# Patient Record
Sex: MALE | Race: BLACK OR AFRICAN AMERICAN | NOT HISPANIC OR LATINO | Employment: OTHER | URBAN - METROPOLITAN AREA
[De-identification: names, ages, dates, MRNs, and addresses within clinical notes are randomized per-mention and may not be internally consistent; named-entity substitution may affect disease eponyms.]

---

## 2017-01-05 ENCOUNTER — HOSPITAL ENCOUNTER (EMERGENCY)
Facility: HOSPITAL | Age: 67
Discharge: HOME | End: 2017-01-05

## 2017-01-05 VITALS
WEIGHT: 205 LBS | BODY MASS INDEX: 30.36 KG/M2 | HEIGHT: 69 IN | SYSTOLIC BLOOD PRESSURE: 160 MMHG | OXYGEN SATURATION: 100 % | RESPIRATION RATE: 18 BRPM | TEMPERATURE: 98 F | HEART RATE: 94 BPM | DIASTOLIC BLOOD PRESSURE: 55 MMHG

## 2017-01-05 DIAGNOSIS — N28.9 ACUTE RENAL INSUFFICIENCY: ICD-10-CM

## 2017-01-05 DIAGNOSIS — N20.0 KIDNEY STONE ON LEFT SIDE: Primary | ICD-10-CM

## 2017-01-05 DIAGNOSIS — N13.2 HYDRONEPHROSIS WITH URINARY OBSTRUCTION DUE TO URETERAL CALCULUS: ICD-10-CM

## 2017-01-05 DIAGNOSIS — D72.829 LEUKOCYTOSIS, UNSPECIFIED TYPE: ICD-10-CM

## 2017-01-05 LAB
ALBUMIN SERPL BCP-MCNC: 3.6 G/DL
ALP SERPL-CCNC: 89 U/L
ALT SERPL W/O P-5'-P-CCNC: 21 U/L
ANION GAP SERPL CALC-SCNC: 9 MMOL/L
AST SERPL-CCNC: 25 U/L
BACTERIA #/AREA URNS HPF: ABNORMAL /HPF
BASOPHILS # BLD AUTO: 0.02 K/UL
BASOPHILS NFR BLD: 0.1 %
BILIRUB SERPL-MCNC: 0.8 MG/DL
BILIRUB UR QL STRIP: NEGATIVE
BUN SERPL-MCNC: 20 MG/DL
CALCIUM SERPL-MCNC: 9.5 MG/DL
CHLORIDE SERPL-SCNC: 103 MMOL/L
CLARITY UR: CLEAR
CO2 SERPL-SCNC: 26 MMOL/L
COLOR UR: YELLOW
CREAT SERPL-MCNC: 1.5 MG/DL
DIFFERENTIAL METHOD: ABNORMAL
EOSINOPHIL # BLD AUTO: 0 K/UL
EOSINOPHIL NFR BLD: 0 %
ERYTHROCYTE [DISTWIDTH] IN BLOOD BY AUTOMATED COUNT: 13.5 %
EST. GFR  (AFRICAN AMERICAN): 56 ML/MIN/1.73 M^2
EST. GFR  (NON AFRICAN AMERICAN): 49 ML/MIN/1.73 M^2
GLUCOSE SERPL-MCNC: 116 MG/DL
GLUCOSE UR QL STRIP: NEGATIVE
HCT VFR BLD AUTO: 42.4 %
HGB BLD-MCNC: 14.8 G/DL
HGB UR QL STRIP: ABNORMAL
KETONES UR QL STRIP: NEGATIVE
LEUKOCYTE ESTERASE UR QL STRIP: ABNORMAL
LYMPHOCYTES # BLD AUTO: 1.3 K/UL
LYMPHOCYTES NFR BLD: 8.1 %
MCH RBC QN AUTO: 28.2 PG
MCHC RBC AUTO-ENTMCNC: 34.9 %
MCV RBC AUTO: 81 FL
MICROSCOPIC COMMENT: ABNORMAL
MONOCYTES # BLD AUTO: 1.5 K/UL
MONOCYTES NFR BLD: 9.4 %
NEUTROPHILS # BLD AUTO: 13.3 K/UL
NEUTROPHILS NFR BLD: 82.4 %
NITRITE UR QL STRIP: NEGATIVE
PH UR STRIP: 6 [PH] (ref 5–8)
PLATELET # BLD AUTO: 243 K/UL
PMV BLD AUTO: 9.5 FL
POTASSIUM SERPL-SCNC: 4.1 MMOL/L
PROT SERPL-MCNC: 7.4 G/DL
PROT UR QL STRIP: ABNORMAL
RBC # BLD AUTO: 5.25 M/UL
RBC #/AREA URNS HPF: 40 /HPF (ref 0–4)
SODIUM SERPL-SCNC: 138 MMOL/L
SP GR UR STRIP: 1.02 (ref 1–1.03)
SQUAMOUS #/AREA URNS HPF: 5 /HPF
URN SPEC COLLECT METH UR: ABNORMAL
UROBILINOGEN UR STRIP-ACNC: NEGATIVE EU/DL
WBC # BLD AUTO: 16.2 K/UL
WBC #/AREA URNS HPF: 8 /HPF (ref 0–5)

## 2017-01-05 PROCEDURE — 96374 THER/PROPH/DIAG INJ IV PUSH: CPT

## 2017-01-05 PROCEDURE — 99284 EMERGENCY DEPT VISIT MOD MDM: CPT | Mod: 25

## 2017-01-05 PROCEDURE — 63600175 PHARM REV CODE 636 W HCPCS: Performed by: PHYSICIAN ASSISTANT

## 2017-01-05 PROCEDURE — 25000003 PHARM REV CODE 250: Performed by: PHYSICIAN ASSISTANT

## 2017-01-05 PROCEDURE — 81000 URINALYSIS NONAUTO W/SCOPE: CPT

## 2017-01-05 PROCEDURE — 80053 COMPREHEN METABOLIC PANEL: CPT

## 2017-01-05 PROCEDURE — 96361 HYDRATE IV INFUSION ADD-ON: CPT

## 2017-01-05 PROCEDURE — 85025 COMPLETE CBC W/AUTO DIFF WBC: CPT

## 2017-01-05 RX ORDER — CIPROFLOXACIN 500 MG/1
500 TABLET ORAL
Status: COMPLETED | OUTPATIENT
Start: 2017-01-05 | End: 2017-01-05

## 2017-01-05 RX ORDER — CIPROFLOXACIN 500 MG/1
500 TABLET ORAL EVERY 12 HOURS
Qty: 19 TABLET | Refills: 0 | Status: SHIPPED | OUTPATIENT
Start: 2017-01-05 | End: 2017-01-15

## 2017-01-05 RX ORDER — KETOROLAC TROMETHAMINE 10 MG/1
10 TABLET, FILM COATED ORAL EVERY 6 HOURS
COMMUNITY
End: 2017-01-05 | Stop reason: ALTCHOICE

## 2017-01-05 RX ORDER — OXYCODONE AND ACETAMINOPHEN 10; 325 MG/1; MG/1
1 TABLET ORAL EVERY 4 HOURS PRN
Qty: 15 TABLET | Refills: 0 | Status: SHIPPED | OUTPATIENT
Start: 2017-01-05

## 2017-01-05 RX ORDER — OXYCODONE AND ACETAMINOPHEN 5; 325 MG/1; MG/1
1 TABLET ORAL EVERY 4 HOURS PRN
COMMUNITY
End: 2017-01-05 | Stop reason: ALTCHOICE

## 2017-01-05 RX ORDER — HYDROMORPHONE HYDROCHLORIDE 1 MG/ML
0.5 INJECTION, SOLUTION INTRAMUSCULAR; INTRAVENOUS; SUBCUTANEOUS
Status: DISCONTINUED | OUTPATIENT
Start: 2017-01-05 | End: 2017-01-05

## 2017-01-05 RX ORDER — KETOROLAC TROMETHAMINE 30 MG/ML
30 INJECTION, SOLUTION INTRAMUSCULAR; INTRAVENOUS
Status: COMPLETED | OUTPATIENT
Start: 2017-01-05 | End: 2017-01-05

## 2017-01-05 RX ADMIN — SODIUM CHLORIDE 1000 ML: 0.9 INJECTION, SOLUTION INTRAVENOUS at 01:01

## 2017-01-05 RX ADMIN — KETOROLAC TROMETHAMINE 30 MG: 30 INJECTION, SOLUTION INTRAMUSCULAR at 01:01

## 2017-01-05 RX ADMIN — CIPROFLOXACIN HYDROCHLORIDE 500 MG: 500 TABLET, FILM COATED ORAL at 03:01

## 2017-01-05 NOTE — DISCHARGE INSTRUCTIONS
Treating Kidney Stones: Medications  In some cases, your doctor may prescribe medications to dissolve or prevent stones. Or medications may be prescribed to stop an infection. Once the infection is controlled, your stone can be removed.    Medications  For uric acid or cystine stones, your doctor may prescribe medications. Youll take these for your lifetime. Medications cant dissolve calcium oxalate stones, but often help prevent them. If you have an infection stone, your doctor may prescribe antibiotics. You may take these before and after your stone is removed.  Uric acid stones are caused by too much uric acid in your urine. This can be worsened by a high-meat diet. Allopurinol reduces uric acid. The stone can be dissolved with bicarbonate, potassium citrate, or a similar drug.  Cystine stones are caused by too much cystine (an amino acid) in your urine. This condition is uncommon and inherited. Penicillamine or tiopronin reduces cystine. Bicarbonate, potassium citrate, or a similar drug dissolves cystine stones.  Infection stones are caused by kidney or bladder infections that change the chemical balance of your urine. Antibiotics control the infection and may slow the stones growth. Then your stone is removed. Stone infections are caused by bacteria that make an enzyme called urease. Your doctor may use a medicine to block this enzyme. Your doctor may also prescribe medications to relax the ureters and allow the stones to pass through more quickly.  Calcium stones are caused by a number of different things. If you have too much calcium in your urine, your doctor may prescribe diuretics. If your urine has too much oxalate or if your stones are from too little citrate, your doctor may give you a different medication.   © 7369-7335 [employer] [address] [place] [address], [place] [address]. All rights reserved. This information is not intended as a substitute for professional medical care. Always  follow your healthcare professional's instructions.          Kidney Stone (with Pain)    The sharp cramping pain on either side of your lower back and nausea/vomiting that you have are because of a small stone that has formed in the kidney. It is now passing down a narrow tube (ureter) on its way to your bladder. Once the stone reaches your bladder, the pain will often stop. But it may come back as the stone continues to pass out of the bladder and through the urethra. The stone may pass in your urine stream in one piece. The size may be 1/16 inch to 1/4 inch (1 to 6 mm). Or, the stone may break up into yoav fragments that you may not even notice.  Once you have had a kidney stone, you are at risk of getting another one in the future. There are 4 types of kidney stones. Eighty percent are calcium stones--mostly calcium oxalate but also some with calcium phosphate. The other 3 types include uric acid stones, struvite stones (from a preceding infection), and rarely, cystine stones.  Most stones will pass on their own, but may take from a few hours to a few days. Sometimes the stone is too large to pass by itself. In that case, the health care provider will need to use other ways to remove the stone. These techniques include:  · Lithotripsy. This uses ultrasound waves to break up the stone.  · Ureteroscopy. This pushes a basket-like instrument through the urethra and bladder and into the ureter to pull out the stone.  · Various types of direct surgery through the skin  Home care  The following are general care guidelines:  · Drink plenty of fluids. This means at least 12, 8-ounce glasses of fluid--mostly water--a day.  · Each time you urinate, do so in a jar. Pour the urine from the jar through the strainer and into the toilet. Continue doing this until 24 hours after your pain stops. By then, if there was a kidney stone, it should pass from your bladder. Some stones dissolve into sand-like particles and pass right  through the strainer. In that case, you wont ever see a stone.  · Save any stone that you find in the strainer and bring it to your doctor to look at. It may be possible to stop certain types of stones from forming. For this reason, it is important to know what kind of stone you have.  · Try to stay as active as possible. This will help the stone pass. Don't stay in bed unless your pain keeps you from getting up. You may notice a red, pink, or brown color to your urine. This is normal while passing a kidney stone.  · If you develop pain, you may take ibuprofen or naproxen for pain, unless another medicine was prescribed. If you have chronic liver or kidney disease, talk with your health care provider before taking these medicines. Also talk with your provider if you've had a stomach ulcer or GI bleeding.  Preventing stones  Each year for the next 5 to 7 years, you are at risk that a new stone will form. Your risk is a 50% chance over this time period. The risk is higher if you have a family history of kidney stones or have certain chronic illnesses like hypertension, obesity, or diabetes. But you can make changes to your lifestyle and diet that can lower your risk for another stone.  Most kidney stones are made of calcium. The following is advice for preventing another calcium stone. If you dont know the type of stone you have, follow this advice until the cause of your stone is found.  Things that help:  · The most important thing you can do is to drink plenty of fluids each day. See home care above.   · Eat foods that contain phytates. These include wheat, rice, rye, barley, and beans. Phytates are substances that may lower your risk for any type of stone for form.  · Eat more fruits and vegetables. Choose those that are high in potassium.  · Eat foods high in natural citrate like fruit and low-sugar fruit juices.  · Having too little calcium in your diet can put you at risk for calcium kidney stones. Eat a  normal amount of calcium in your diet and talk with your health care provider if you are taking calcium supplements. Cutting back on your calcium intake may raise your risk. New research shows that eating calcium-rich and oxalate-rich foods together lowers your risk for stones by binding the minerals in the stomach and intestines before they can reach the kidneys.    · Limit salt intake to 2 grams (1 teaspoon) per day. Use limited amounts when cooking, and dont add salt at the table. Processed and canned foods are usually high in salt.   · Spinach, rhubarb, peanuts, cashews, almonds, grapefruit, and grapefruit juice are all high oxalate foods. You should limit how much of these you eat. Or eat them with calcium-rich foods. These include dairy products, dark leafy greens, soy products, and calcium-enriched foods.  · Reducing the amount of animal meat and high protein foods in your diet may lower your risk of uric acid stones.  · Avoid excess sugar (sucrose) and fructose (sweetener in many soft drinks) in your diet.   · If you take vitamin C as a supplement, don't take more than 1,000 mg a day.  · A dietitian or your health provider can give you information about changes in your diet that will help stop more kidney stone from forming.  Follow-up care  Follow up with your health care provider, or as advised, if the pain lasts more than 48 hours. Talk with your provider about urine and blood tests to find out the cause of your stone. If you had an X-ray, CT scan, or other diagnostic test, it will be looked at by a specialist. You will be told of any new findings that may affect your care.  When to seek medical advice  Call your health care provider right away if any of these occur:  · Pain that is not controlled by the medicine given  · Repeated vomiting or unable to keep down fluids  · Weakness, dizziness, or fainting  · Fever of 100.4ºF (38ºC) or higher, or as directed by your health care provider  · Passage of solid  red or brown urine (can't see through it) or urine with lots of blood clots  · Foul-smelling or cloudy urine  · Unable to pass urine for 8 hours and increasing bladder pressure  © 0077-3978 [employer]. [address] [place] [address], [place] [address]. All rights reserved. This information is not intended as a substitute for professional medical care. Always follow your healthcare professional's instructions.          Treating Kidney Stones: Expectant Therapy  Most kidney stones are about the size of a grape seed. Stones of this size are small enough to pass naturally. Once it is passed, a stone can be analyzed. This wait and see approach is called expectant therapy. Small stones can often be passed with expectant therapy. If pain is a problem, ask your doctor about pain medications. Then follow his or her directions on how much water to drink. Drinking more water creates more urine to flush out your stone. Also be sure to strain your urine. Take any stones you pass to your doctor for analysis.    Drink lots of water  Drinking lots of water may help your stone pass. Water also dilutes the chemicals in your urine. This reduces your risk of forming new stones. You may be told to drink 8, 12-ounce glasses of water a day. Avoid liquids that dehydrate you, such as those containing caffeine or alcohol.  Strain your urine  Straining your urine lets you collect your stone for analysis. Use the strainer each time you urinate. Strain your urine for as long as your doctor suggests. Watch for brown, tan, gold, or black specks or tiny anant. These may be kidney stones.  Take your medicine  Your doctor may give you medicine that makes it more likely for you to pass the kidney stone.   Follow up with your doctor  Follow up by taking any stones you find to your doctor for analysis. The type of stone you have determines your diet and prevention program. You may need more tests in the future. These tests will ensure that  new stones are not forming.  © 5973-8225 [employer]. [address] [place] [address], [place] [address]. All rights reserved. This information is not intended as a substitute for professional medical care. Always follow your healthcare professional's instructions.          Preventing Kidney Stones  If youve had a kidney stone, you may worry that youll have another. Removing or passing your stone doesnt prevent future stones. With your doctors help, though, you can reduce your risk of forming new stones. Follow up with your doctor to help detect new stones. You may need follow-up every 3 months to a year for a lifetime.    Drink lots of water  Staying well-hydrated is the best way to reduce your risk of future stones. Drink 8 12-ounce glasses of water daily. Have 2 with each meal and 2 between meals. Try keeping a pitcher of water nearby during the day and at night.  Take medications if needed  Medications, including vitamins and minerals, may be prescribed for certain types of stones. You may want to write your doses and medication times on a calendar. Some medications decrease stone-forming chemicals in your blood. Others help prevent those chemicals from crystallizing in urine. Still others help keep a normal acid balance in your urine.  Follow your prescribed diet  Your doctor will tell you which foods contain the chemicals you should avoid. Your doctor may also suggest talking to a dietitian. He or she can help you plan meals youll enjoy. These meals wont put you at risk for future stones. You may be told to limit certain foods, depending on which type of stones youve had. You should limit the amount of salt in your food to about 2 grams a day. This will help prevent most types of kidney stones. Make sure you get an adequate amount of calcium in your diet.  For calcium oxalate stones: Limit animal protein, such as meat, eggs, and fish. Limit grapefruit juice and alcohol. Limit high-oxalate foods  (such as cola, tea, chocolate, spinach, rhubarb, wheat bran, and peanuts).  For uric acid stones: Limit high-purine foods, such as mushrooms, peas, beans, anchovies, meat, poultry, shellfish, and organ meats. These foods increase uric acid production.  For cystine stones: Limit high-methionine foods (fish is the most common, but eggs and meats, also). These foods increase production of cystine.  © 5237-3240 [employer]. [address] [place] [address]. All rights reserved. This information is not intended as a substitute for professional medical care. Always follow your healthcare professional's instructions.          Renal Insufficiency    Your kidneys remove waste products and extra water from your body. When your kidneys dont work as they should, waste products build up in your blood. The early stage of this process is called renal insufficiency. If renal insufficiency gets worse, you can develop chronic renal failure. This allows extra water, waste, and toxic substances to build up in your body. This can become life threatening. You may need dialysis or a kidney transplant. The most serious form of renal insufficiency is end-stage renal disease.  Diabetes is the main cause of renal insufficiency.  Other causes include:  · High blood pressure  · Hardening of the arteries  · Lupus  · Inflammation of the blood vessels (vasculitis)  · Viral or bacterial infection  Some over-the-counter (OTC) pain medicines can cause renal failure if you take them for a long time. These include aspirin, ibuprofen, and other nonsteroidal anti-inflammatory drugs (NSAIDs).  Home care  Follow these tips when caring for yourself at home:  · If you have diabetes, talk with your health care provider about controlling your blood sugar. Ask if you need to make any changes to your diet, lifestyle, or medicines.  · If you have high blood pressure:  ¨ Take your prescribed medicine. Your goal is to lower your blood  pressure to less than 130/80, or to the goal set by your provider.  ¨ Do a regular exercise program that you enjoy. Check with your provider to be sure your planned exercise program is right for you.  ¨ Cut back on the amount of salt (sodium) you eat. Your provider can tell you how much salt each day is safe for you.  · If you are overweight, talk with your provider about a weight loss plan.  · If you smoke, quit. Smoking makes kidney disease worse. Talk with your provider about ways to help you quit. For more information, visit:  ¨ smokefree.gov/sites/default/files/pdf/clearing-the-air-accessible.pdf  ¨ www.smokefree.gov  ¨ www.cancer.org/healthy/stayawayfromtobacco/guidetoquittingsmoking/  · Talk with your provider about any restrictions you should make in your diet. In general, you should limit the amount of protein, salt, potassium, and phosphorus. Dont drink too many fluids. Dont add salt at the table, and stay away from salty foods. You may need a calcium supplement to help prevent osteoporosis.  · Talk with your provider about any medicines you are taking to find out if they need to be reduced or stopped.  · Dont take the following OTC medicines, or talk with your provider before you take them:  ¨ Aspirin, other NSAIDs, and naprosyn. You can use these for a short time to help with fever or pain.  ¨ Laxatives and antacids with magnesium or aluminum  ¨ Phosphosoda enemas with phosphorus  ¨ Certain stomach acid-blocking medicine such as cimetidine or ranitidine  ¨ Decongestants with pseudoephedrine  ¨ Herbal supplements  Follow-up care  Follow up with your health care provider as advised.  Contact one of the following for more information:  · American Association of Kidney Patients www.aakp.org  · National Kidney Foundation www.kidney.org  · American Kidney Fund www.kidneyfund.org  · National Kidney Disease Education Program www.nkdep.nih.gov  When to seek medical advice  Call your health care provider right  away if any of these occur:  · Nausea or vomiting  · Fever over 100.4°F (38.0°C)  · Severe weakness, dizziness, fainting, drowsiness, or confusion  · Chest pain or shortness of breath  · Unexpected weight gain or swelling in the legs, ankles, or around your eyes  · Heart beating fast, slowly, or irregularly  · You dont urinate as much as normal, or you arent able to urinate  © 1249-8290 [employer]. [address] [place] [address], [place] [address]. All rights reserved. This information is not intended as a substitute for professional medical care. Always follow your healthcare professional's instructions.

## 2017-01-05 NOTE — ED PROVIDER NOTES
"Encounter Date: 1/5/2017       History     Chief Complaint   Patient presents with    Flank Pain     left flank pain, recently dx with kidney stone on tuesday. "Pain better yesterday, back today"     Review of patient's allergies indicates:  No Known Allergies  HPI Comments: The patient states that while he was getting ready for work 2 days ago, he suddenly felt a sharp stabbing aching pain to his left flank. He states that the degree of pain was severe. He states that he went to [facility]. He states that they did a urine test and told him he had a blood in his urine and that he had a kidney stone. He states that they prescribed him Toradol, Flomax, and Percocet. He states that no blood tests, or imaging tests were done. He states that he was not given any follow up instructions. He states that he filled the prescriptions, and yesterday the pain was significantly better. Today, he states that the pain is again intense. He denies any additional symptoms. He denies any mitigating or exacerbating factors. He states that he has not taken any of the medications since last night.     The history is provided by the patient.     History reviewed. No pertinent past medical history.  No past medical history pertinent negatives.  No past surgical history on file.  No family history on file.  Social History   Substance Use Topics    Smoking status: Never Smoker    Smokeless tobacco: None    Alcohol use No     Review of Systems   Constitutional: Negative for chills, diaphoresis and fever.   HENT: Negative for sore throat.    Respiratory: Negative for cough, chest tightness and shortness of breath.    Cardiovascular: Negative for chest pain and palpitations.   Gastrointestinal: Negative for abdominal distention, abdominal pain, blood in stool, constipation, diarrhea, nausea and vomiting.   Genitourinary: Positive for flank pain and hematuria. Negative for decreased urine volume, difficulty urinating, dysuria, " "frequency, testicular pain and urgency.   Musculoskeletal: Negative for arthralgias, gait problem and myalgias.   Skin: Negative for rash.   Allergic/Immunologic: Negative for immunocompromised state.   Neurological: Negative for dizziness, seizures, syncope, weakness, light-headedness, numbness and headaches.   Psychiatric/Behavioral: Negative for confusion.       Physical Exam   Initial Vitals   BP Pulse Resp Temp SpO2   01/05/17 1221 01/05/17 1221 01/05/17 1221 01/05/17 1221 01/05/17 1221   193/93 114 20 98.8 °F (37.1 °C) 95 %     Vitals:    01/05/17 1221 01/05/17 1405 01/05/17 1522   BP: (!) 193/93 (!) 146/80 (!) 160/55   Pulse: (!) 114 95 94   Resp: 20 17 18   Temp: 98.8 °F (37.1 °C) 98.3 °F (36.8 °C) 98.3 °F (36.8 °C)   TempSrc: Oral  Oral   SpO2: 95% 100% 100%   Weight: 93 kg (205 lb)     Height: 5' 9" (1.753 m)       Physical Exam    Nursing note and vitals reviewed.  Constitutional: He appears well-developed and well-nourished. He is not diaphoretic. He appears distressed.   HENT:   Mouth/Throat: Oropharynx is clear and moist.   Eyes: Conjunctivae are normal. No scleral icterus.   Cardiovascular: Normal rate, regular rhythm and intact distal pulses.   Pulmonary/Chest: Breath sounds normal. No respiratory distress.   Abdominal: Soft. He exhibits no distension. There is no tenderness. There is no rebound and no guarding.   Musculoskeletal: Normal range of motion.   Left CVA tenderness.    Neurological: He is alert and oriented to person, place, and time. He has normal strength. No cranial nerve deficit or sensory deficit.   Skin: Skin is warm and dry. No rash noted.   Psychiatric: He has a normal mood and affect. His behavior is normal.         ED Course   Procedures  Labs Reviewed   URINALYSIS - Abnormal; Notable for the following:        Result Value    Protein, UA Trace (*)     Occult Blood UA 3+ (*)     Leukocytes, UA Trace (*)     All other components within normal limits   CBC W/ AUTO DIFFERENTIAL - " Abnormal; Notable for the following:     WBC 16.20 (*)     MCV 81 (*)     Gran # 13.3 (*)     Mono # 1.5 (*)     Gran% 82.4 (*)     Lymph% 8.1 (*)     All other components within normal limits   COMPREHENSIVE METABOLIC PANEL - Abnormal; Notable for the following:     Glucose 116 (*)     Creatinine 1.5 (*)     eGFR if  56 (*)     eGFR if non  49 (*)     All other components within normal limits   URINALYSIS MICROSCOPIC - Abnormal; Notable for the following:     RBC, UA 40 (*)     WBC, UA 8 (*)     Bacteria, UA Few (*)     All other components within normal limits     Results for orders placed or performed during the hospital encounter of 01/05/17   Urinalysis   Result Value Ref Range    Specimen UA Urine, Clean Catch     Color, UA Yellow Yellow, Straw, Sara    Appearance, UA Clear Clear    pH, UA 6.0 5.0 - 8.0    Specific Gravity, UA 1.025 1.005 - 1.030    Protein, UA Trace (A) Negative    Glucose, UA Negative Negative    Ketones, UA Negative Negative    Bilirubin (UA) Negative Negative    Occult Blood UA 3+ (A) Negative    Nitrite, UA Negative Negative    Urobilinogen, UA Negative <2.0 EU/dL    Leukocytes, UA Trace (A) Negative   CBC auto differential   Result Value Ref Range    WBC 16.20 (H) 3.90 - 12.70 K/uL    RBC 5.25 4.60 - 6.20 M/uL    Hemoglobin 14.8 14.0 - 18.0 g/dL    Hematocrit 42.4 40.0 - 54.0 %    MCV 81 (L) 82 - 98 fL    MCH 28.2 27.0 - 31.0 pg    MCHC 34.9 32.0 - 36.0 %    RDW 13.5 11.5 - 14.5 %    Platelets 243 150 - 350 K/uL    MPV 9.5 9.2 - 12.9 fL    Gran # 13.3 (H) 1.8 - 7.7 K/uL    Lymph # 1.3 1.0 - 4.8 K/uL    Mono # 1.5 (H) 0.3 - 1.0 K/uL    Eos # 0.0 0.0 - 0.5 K/uL    Baso # 0.02 0.00 - 0.20 K/uL    Gran% 82.4 (H) 38.0 - 73.0 %    Lymph% 8.1 (L) 18.0 - 48.0 %    Mono% 9.4 4.0 - 15.0 %    Eosinophil% 0.0 0.0 - 8.0 %    Basophil% 0.1 0.0 - 1.9 %    Differential Method Automated    Comprehensive metabolic panel   Result Value Ref Range    Sodium 138 136 - 145  "mmol/L    Potassium 4.1 3.5 - 5.1 mmol/L    Chloride 103 95 - 110 mmol/L    CO2 26 23 - 29 mmol/L    Glucose 116 (H) 70 - 110 mg/dL    BUN, Bld 20 8 - 23 mg/dL    Creatinine 1.5 (H) 0.5 - 1.4 mg/dL    Calcium 9.5 8.7 - 10.5 mg/dL    Total Protein 7.4 6.0 - 8.4 g/dL    Albumin 3.6 3.5 - 5.2 g/dL    Total Bilirubin 0.8 0.1 - 1.0 mg/dL    Alkaline Phosphatase 89 55 - 135 U/L    AST 25 10 - 40 U/L    ALT 21 10 - 44 U/L    Anion Gap 9 8 - 16 mmol/L    eGFR if African American 56 (A) >60 mL/min/1.73 m^2    eGFR if non African American 49 (A) >60 mL/min/1.73 m^2   Urinalysis Microscopic   Result Value Ref Range    RBC, UA 40 (H) 0 - 4 /hpf    WBC, UA 8 (H) 0 - 5 /hpf    Bacteria, UA Few (A) None-Occ /hpf    Squam Epithel, UA 5 /hpf    Microscopic Comment SEE COMMENT      Vitals:    01/05/17 1221 01/05/17 1405   BP: (!) 193/93 (!) 146/80   BP Location: Right arm    Patient Position: Sitting    Pulse: (!) 114 95   Resp: 20 17   Temp: 98.8 °F (37.1 °C) 98.3 °F (36.8 °C)   TempSrc: Oral    SpO2: 95% 100%   Weight: 93 kg (205 lb)    Height: 5' 9" (1.753 m)        Imaging Results         CT Renal Stone Study ABD Pelvis WO (Final result) Result time:  01/05/17 13:38:56    Final result by Lele Islas MD (01/05/17 13:38:56)    Impression:     Evidence of high-grade left hydroureteronephrosis with distal left ureteral stone approximately 7-8 mm in size.    Nonobstructing 2 mm right lower pole calcification.    Lumbar degenerative disc disease.      All CT scans at this facility use dose modulation, iterative reconstruction and/or weight based dosing when appropriate to reduce radiation dose to as low as reasonably achievable.       Electronically signed by: LELE ISLAS MD  Date:     01/05/17  Time:    13:38     Narrative:    CT RENAL STONE STUDY ABD PELVIS WO,     Date:  01/05/17 13:10:53    Technique: Limited noncontrast CT scan of the abdomen and pelvis.No previous comparison    History:  left flank pain, " "    Findings:  The lung bases are clear.     The liver, spleen, and pancreas appear grossly normal.     The visible bowel is grossly unremarkable.    The right kidney demonstrates a small punctate 2 mm lower pole calcification.    The left kidney is enlarged with perinephric stranding and distention of the collecting system.  The left ureter is distended as well.  In the left pelvis there appears to be a large left ureteral stone measuring up to 7 mm in diameter.    The prostate gland is mildly enlarged.                      Medical Decision Making:   Initial Assessment:   The patient presents to the ER for an emergent evaluation due to left sided flank pain that began suddenly 2 days ago.   Differential Diagnosis:   Kidney stone, UTI, muscle strain, renal cancer, diverticulitis, pneumonia, ARF, etc   Clinical Tests:   Lab Tests: Ordered and Reviewed  Radiological Study: Ordered and Reviewed  ED Management:  CT scan shows obstructing 7-8 mm stone in distal left ureter with left sided hydronephrosis.   UA negative for infection   WBC elevated   Chemistry shows slight elevation in serum Cr at 1.5     On re-examination, the patient states that he feels "completely better" after receiving IV fluids and IV analgesic, and that he is ready to go home. I had ordered additional IV fluids and pain medication, but he refused stating that he is better and he wants to leave now.      We discussed his test results, diagnosis, discharge, and follow up instructions. Due to his acute renal insufficiency, I instructed him to DC Toradol for now. I also instructed him to DC taking the Percocet 5, as we will replace with Percocet 10 due to him having uncontrolled pain. I advised him to continue Flomax. We provided him with a urine strainer. We also initiated Cipro in the ER and provided a Rx for prophylaxis.     He agrees to call [facility] today to schedule a follow up appointment with urology for tomorrow to be re-checked. He agrees to " return to the ER immediately if unimproved or if worse in any way.               Attending Attestation:     Physician Attestation Statement for NP/PA:   I discussed this assessment and plan of this patient with the NP/PA, but I did not personally examine the patient. The face to face encounter was performed by the NP/PA.                  ED Course     Clinical Impression:   The primary encounter diagnosis was Kidney stone on left side. Diagnoses of Hydronephrosis with urinary obstruction due to ureteral calculus, Leukocytosis, unspecified type, Acute renal insufficiency, and Elevated blood pressure were also pertinent to this visit.    Disposition:   Disposition: Discharged  Condition: Stable  I informed the patient that his blood pressure reading was significantly elevated today and I instructed him to follow up closely with his primary care physician to be properly evaluated for possible HTN or pre-HTN. He verbalizes agreement and understanding.        Bubba Andrews PA-C  01/05/17 1522       Kaden Burris MD  01/06/17 0962

## 2017-01-05 NOTE — ED AVS SNAPSHOT
[facility] [address] [place] [address]               Barney Contreras JrRanjana   2017 12:23 PM   ED    Description:  Male : 1950   Department:  [facility]           Your Care was Coordinated By:     Provider Role From To    Bubba Andrews PA-C Physician Assistant 17 1223 --      Reason for Visit     Flank Pain           Diagnoses this Visit        Comments    Kidney stone on left side    -  Primary     Hydronephrosis with urinary obstruction due to ureteral calculus         Leukocytosis, unspecified type         Acute renal insufficiency           ED Disposition     ED Disposition Condition Comment    Discharge             To Do List           Follow-up Information     Follow up with [facility]. Schedule an appointment as soon as possible for a visit in 1 day.    Specialty:  Urology    Why:  Follow up with [facility] in 1 day for re-evaluation and further management. Continue taking Flomax as directed. Discontinue taking Toradol.     Contact information:    [address] [facility] [address] [phone]    Additional information:    [facility] 4th floor        Follow up with [facility].    Specialty:  Emergency Medicine    Why:  If symptoms worsen in any way or if unimproved.     Contact information:    [address] [place] [address] [phone]       These Medications        Disp Refills Start End    oxycodone-acetaminophen (PERCOCET)  mg per tablet 15 tablet 0 2017     Take 1 tablet by mouth every 4 (four) hours as needed for Pain. - Oral    ciprofloxacin HCl (CIPRO) 500 MG tablet 19 tablet 0 2017 1/15/2017    Take 1 tablet (500 mg total) by mouth every 12 (twelve) hours. - Oral      [facility]     [facility] -  Assistance  Registered nurses in the [facility] provide clinical  advisement, health education, appointment booking, and other advisory services.  Call for this free service at [phone].             Medications           Message regarding Medications     Verify the changes and/or additions to your medication regime listed below are the same as discussed with your clinician today.  If any of these changes or additions are incorrect, please notify your healthcare provider.        START taking these NEW medications        Refills    oxycodone-acetaminophen (PERCOCET)  mg per tablet 0    Sig: Take 1 tablet by mouth every 4 (four) hours as needed for Pain.    Class: Print    Route: Oral    ciprofloxacin HCl (CIPRO) 500 MG tablet 0    Sig: Take 1 tablet (500 mg total) by mouth every 12 (twelve) hours.    Class: Print    Route: Oral      These medications were administered today        Dose Freq    ketorolac injection 30 mg 30 mg ED 1 Time    Sig: Inject 30 mg into the vein ED 1 Time.    Class: Normal    Route: Intravenous    Non-formulary Exception Code: Defer to pharmacy    Cosign for Ordering: Required by Kaden Burris MD    sodium chloride 0.9% bolus 1,000 mL 1,000 mL ED 1 Time    Sig: Inject 1,000 mLs into the vein ED 1 Time.    Class: Normal    Route: Intravenous    Cosign for Ordering: Required by Kaden Burris MD    ciprofloxacin HCl tablet 500 mg 500 mg ED 1 Time    Sig: Take 1 tablet (500 mg total) by mouth ED 1 Time.    Class: Normal    Route: Oral    Cosign for Ordering: Required by Kaden Burris MD      STOP taking these medications     oxycodone-acetaminophen (PERCOCET) 5-325 mg per tablet Take 1 tablet by mouth every 4 (four) hours as needed for Pain.    ketorolac (TORADOL) 10 mg tablet Take 10 mg by mouth every 6 (six) hours.           Verify that the below list of medications is an accurate representation of the medications you are currently taking.  If none reported, the list may be blank. If incorrect, please contact your healthcare provider. Carry this list  "with you in case of emergency.           Current Medications     TAMSULOSIN HCL (FLOMAX ORAL) Take by mouth.    ciprofloxacin HCl (CIPRO) 500 MG tablet Take 1 tablet (500 mg total) by mouth every 12 (twelve) hours.    oxycodone-acetaminophen (PERCOCET)  mg per tablet Take 1 tablet by mouth every 4 (four) hours as needed for Pain.           Clinical Reference Information           Your Vitals Were     BP Pulse Temp Resp Height Weight    146/80 95 98.3 °F (36.8 °C) 17 5' 9" (1.753 m) 93 kg (205 lb)    SpO2 BMI             100% 30.27 kg/m2         Allergies as of 1/5/2017     No Known Allergies      Immunizations Administered on Date of Encounter - 1/5/2017     None      ED Micro, Lab, POCT     Start Ordered       Status Ordering Provider    01/05/17 1234 01/05/17 1234  CBC auto differential  STAT      Final result     01/05/17 1234 01/05/17 1234  Comprehensive metabolic panel  STAT      Final result     01/05/17 1224 01/05/17 1223  Urinalysis  STAT      Final result     01/05/17 1224 01/05/17 1224  Urinalysis Microscopic  Once      Final result       ED Imaging Orders     Start Ordered       Status Ordering Provider    01/05/17 1234 01/05/17 1234  CT Renal Stone Study ABD Pelvis WO  1 time imaging      Final result         Discharge Instructions         Treating Kidney Stones: Medications  In some cases, your doctor may prescribe medications to dissolve or prevent stones. Or medications may be prescribed to stop an infection. Once the infection is controlled, your stone can be removed.    Medications  For uric acid or cystine stones, your doctor may prescribe medications. Youll take these for your lifetime. Medications cant dissolve calcium oxalate stones, but often help prevent them. If you have an infection stone, your doctor may prescribe antibiotics. You may take these before and after your stone is removed.  Uric acid stones are caused by too much uric acid in your urine. This can be worsened by a " high-meat diet. Allopurinol reduces uric acid. The stone can be dissolved with bicarbonate, potassium citrate, or a similar drug.  Cystine stones are caused by too much cystine (an amino acid) in your urine. This condition is uncommon and inherited. Penicillamine or tiopronin reduces cystine. Bicarbonate, potassium citrate, or a similar drug dissolves cystine stones.  Infection stones are caused by kidney or bladder infections that change the chemical balance of your urine. Antibiotics control the infection and may slow the stones growth. Then your stone is removed. Stone infections are caused by bacteria that make an enzyme called urease. Your doctor may use a medicine to block this enzyme. Your doctor may also prescribe medications to relax the ureters and allow the stones to pass through more quickly.  Calcium stones are caused by a number of different things. If you have too much calcium in your urine, your doctor may prescribe diuretics. If your urine has too much oxalate or if your stones are from too little citrate, your doctor may give you a different medication.   © 8275-8697 [employer]. [address] [place] [address]. All rights reserved. This information is not intended as a substitute for professional medical care. Always follow your healthcare professional's instructions.          Kidney Stone (with Pain)    The sharp cramping pain on either side of your lower back and nausea/vomiting that you have are because of a small stone that has formed in the kidney. It is now passing down a narrow tube (ureter) on its way to your bladder. Once the stone reaches your bladder, the pain will often stop. But it may come back as the stone continues to pass out of the bladder and through the urethra. The stone may pass in your urine stream in one piece. The size may be 1/16 inch to 1/4 inch (1 to 6 mm). Or, the stone may break up into yoav fragments that you may not even notice.  Once you  have had a kidney stone, you are at risk of getting another one in the future. There are 4 types of kidney stones. Eighty percent are calcium stones--mostly calcium oxalate but also some with calcium phosphate. The other 3 types include uric acid stones, struvite stones (from a preceding infection), and rarely, cystine stones.  Most stones will pass on their own, but may take from a few hours to a few days. Sometimes the stone is too large to pass by itself. In that case, the health care provider will need to use other ways to remove the stone. These techniques include:  · Lithotripsy. This uses ultrasound waves to break up the stone.  · Ureteroscopy. This pushes a basket-like instrument through the urethra and bladder and into the ureter to pull out the stone.  · Various types of direct surgery through the skin  Home care  The following are general care guidelines:  · Drink plenty of fluids. This means at least 12, 8-ounce glasses of fluid--mostly water--a day.  · Each time you urinate, do so in a jar. Pour the urine from the jar through the strainer and into the toilet. Continue doing this until 24 hours after your pain stops. By then, if there was a kidney stone, it should pass from your bladder. Some stones dissolve into sand-like particles and pass right through the strainer. In that case, you wont ever see a stone.  · Save any stone that you find in the strainer and bring it to your doctor to look at. It may be possible to stop certain types of stones from forming. For this reason, it is important to know what kind of stone you have.  · Try to stay as active as possible. This will help the stone pass. Don't stay in bed unless your pain keeps you from getting up. You may notice a red, pink, or brown color to your urine. This is normal while passing a kidney stone.  · If you develop pain, you may take ibuprofen or naproxen for pain, unless another medicine was prescribed. If you have chronic liver or kidney  disease, talk with your health care provider before taking these medicines. Also talk with your provider if you've had a stomach ulcer or GI bleeding.  Preventing stones  Each year for the next 5 to 7 years, you are at risk that a new stone will form. Your risk is a 50% chance over this time period. The risk is higher if you have a family history of kidney stones or have certain chronic illnesses like hypertension, obesity, or diabetes. But you can make changes to your lifestyle and diet that can lower your risk for another stone.  Most kidney stones are made of calcium. The following is advice for preventing another calcium stone. If you dont know the type of stone you have, follow this advice until the cause of your stone is found.  Things that help:  · The most important thing you can do is to drink plenty of fluids each day. See home care above.   · Eat foods that contain phytates. These include wheat, rice, rye, barley, and beans. Phytates are substances that may lower your risk for any type of stone for form.  · Eat more fruits and vegetables. Choose those that are high in potassium.  · Eat foods high in natural citrate like fruit and low-sugar fruit juices.  · Having too little calcium in your diet can put you at risk for calcium kidney stones. Eat a normal amount of calcium in your diet and talk with your health care provider if you are taking calcium supplements. Cutting back on your calcium intake may raise your risk. New research shows that eating calcium-rich and oxalate-rich foods together lowers your risk for stones by binding the minerals in the stomach and intestines before they can reach the kidneys.    · Limit salt intake to 2 grams (1 teaspoon) per day. Use limited amounts when cooking, and dont add salt at the table. Processed and canned foods are usually high in salt.   · Spinach, rhubarb, peanuts, cashews, almonds, grapefruit, and grapefruit juice are all high oxalate foods. You should limit  how much of these you eat. Or eat them with calcium-rich foods. These include dairy products, dark leafy greens, soy products, and calcium-enriched foods.  · Reducing the amount of animal meat and high protein foods in your diet may lower your risk of uric acid stones.  · Avoid excess sugar (sucrose) and fructose (sweetener in many soft drinks) in your diet.   · If you take vitamin C as a supplement, don't take more than 1,000 mg a day.  · A dietitian or your health provider can give you information about changes in your diet that will help stop more kidney stone from forming.  Follow-up care  Follow up with your health care provider, or as advised, if the pain lasts more than 48 hours. Talk with your provider about urine and blood tests to find out the cause of your stone. If you had an X-ray, CT scan, or other diagnostic test, it will be looked at by a specialist. You will be told of any new findings that may affect your care.  When to seek medical advice  Call your health care provider right away if any of these occur:  · Pain that is not controlled by the medicine given  · Repeated vomiting or unable to keep down fluids  · Weakness, dizziness, or fainting  · Fever of 100.4ºF (38ºC) or higher, or as directed by your health care provider  · Passage of solid red or brown urine (can't see through it) or urine with lots of blood clots  · Foul-smelling or cloudy urine  · Unable to pass urine for 8 hours and increasing bladder pressure  © 8916-8625 [employer]. [address] [place] [address], [place] [address]. All rights reserved. This information is not intended as a substitute for professional medical care. Always follow your healthcare professional's instructions.          Treating Kidney Stones: Expectant Therapy  Most kidney stones are about the size of a grape seed. Stones of this size are small enough to pass naturally. Once it is passed, a stone can be analyzed. This wait and see approach is called  expectant therapy. Small stones can often be passed with expectant therapy. If pain is a problem, ask your doctor about pain medications. Then follow his or her directions on how much water to drink. Drinking more water creates more urine to flush out your stone. Also be sure to strain your urine. Take any stones you pass to your doctor for analysis.    Drink lots of water  Drinking lots of water may help your stone pass. Water also dilutes the chemicals in your urine. This reduces your risk of forming new stones. You may be told to drink 8, 12-ounce glasses of water a day. Avoid liquids that dehydrate you, such as those containing caffeine or alcohol.  Strain your urine  Straining your urine lets you collect your stone for analysis. Use the strainer each time you urinate. Strain your urine for as long as your doctor suggests. Watch for brown, tan, gold, or black specks or tiny anant. These may be kidney stones.  Take your medicine  Your doctor may give you medicine that makes it more likely for you to pass the kidney stone.   Follow up with your doctor  Follow up by taking any stones you find to your doctor for analysis. The type of stone you have determines your diet and prevention program. You may need more tests in the future. These tests will ensure that new stones are not forming.  © 9906-9818 [employer]. [address] [place] [address], [place] [address]. All rights reserved. This information is not intended as a substitute for professional medical care. Always follow your healthcare professional's instructions.          Preventing Kidney Stones  If youve had a kidney stone, you may worry that youll have another. Removing or passing your stone doesnt prevent future stones. With your doctors help, though, you can reduce your risk of forming new stones. Follow up with your doctor to help detect new stones. You may need follow-up every 3 months to a year for a lifetime.    Drink lots of  water  Staying well-hydrated is the best way to reduce your risk of future stones. Drink 8 12-ounce glasses of water daily. Have 2 with each meal and 2 between meals. Try keeping a pitcher of water nearby during the day and at night.  Take medications if needed  Medications, including vitamins and minerals, may be prescribed for certain types of stones. You may want to write your doses and medication times on a calendar. Some medications decrease stone-forming chemicals in your blood. Others help prevent those chemicals from crystallizing in urine. Still others help keep a normal acid balance in your urine.  Follow your prescribed diet  Your doctor will tell you which foods contain the chemicals you should avoid. Your doctor may also suggest talking to a dietitian. He or she can help you plan meals youll enjoy. These meals wont put you at risk for future stones. You may be told to limit certain foods, depending on which type of stones youve had. You should limit the amount of salt in your food to about 2 grams a day. This will help prevent most types of kidney stones. Make sure you get an adequate amount of calcium in your diet.  For calcium oxalate stones: Limit animal protein, such as meat, eggs, and fish. Limit grapefruit juice and alcohol. Limit high-oxalate foods (such as cola, tea, chocolate, spinach, rhubarb, wheat bran, and peanuts).  For uric acid stones: Limit high-purine foods, such as mushrooms, peas, beans, anchovies, meat, poultry, shellfish, and organ meats. These foods increase uric acid production.  For cystine stones: Limit high-methionine foods (fish is the most common, but eggs and meats, also). These foods increase production of cystine.  © 4265-2808 [employer]. [address] [place] [address], [place] [address]. All rights reserved. This information is not intended as a substitute for professional medical care. Always follow your healthcare professional's instructions.          Renal  Insufficiency    Your kidneys remove waste products and extra water from your body. When your kidneys dont work as they should, waste products build up in your blood. The early stage of this process is called renal insufficiency. If renal insufficiency gets worse, you can develop chronic renal failure. This allows extra water, waste, and toxic substances to build up in your body. This can become life threatening. You may need dialysis or a kidney transplant. The most serious form of renal insufficiency is end-stage renal disease.  Diabetes is the main cause of renal insufficiency.  Other causes include:  · High blood pressure  · Hardening of the arteries  · Lupus  · Inflammation of the blood vessels (vasculitis)  · Viral or bacterial infection  Some over-the-counter (OTC) pain medicines can cause renal failure if you take them for a long time. These include aspirin, ibuprofen, and other nonsteroidal anti-inflammatory drugs (NSAIDs).  Home care  Follow these tips when caring for yourself at home:  · If you have diabetes, talk with your health care provider about controlling your blood sugar. Ask if you need to make any changes to your diet, lifestyle, or medicines.  · If you have high blood pressure:  ¨ Take your prescribed medicine. Your goal is to lower your blood pressure to less than 130/80, or to the goal set by your provider.  ¨ Do a regular exercise program that you enjoy. Check with your provider to be sure your planned exercise program is right for you.  ¨ Cut back on the amount of salt (sodium) you eat. Your provider can tell you how much salt each day is safe for you.  · If you are overweight, talk with your provider about a weight loss plan.  · If you smoke, quit. Smoking makes kidney disease worse. Talk with your provider about ways to help you quit. For more information,  visit:  ¨ smokefree.gov/sites/default/files/pdf/clearing-the-air-accessible.pdf  ¨ www.smokefree.gov  ¨ www.cancer.org/healthy/stayawayfromtobacco/guidetoquittingsmoking/  · Talk with your provider about any restrictions you should make in your diet. In general, you should limit the amount of protein, salt, potassium, and phosphorus. Dont drink too many fluids. Dont add salt at the table, and stay away from salty foods. You may need a calcium supplement to help prevent osteoporosis.  · Talk with your provider about any medicines you are taking to find out if they need to be reduced or stopped.  · Dont take the following OTC medicines, or talk with your provider before you take them:  ¨ Aspirin, other NSAIDs, and naprosyn. You can use these for a short time to help with fever or pain.  ¨ Laxatives and antacids with magnesium or aluminum  ¨ Phosphosoda enemas with phosphorus  ¨ Certain stomach acid-blocking medicine such as cimetidine or ranitidine  ¨ Decongestants with pseudoephedrine  ¨ Herbal supplements  Follow-up care  Follow up with your health care provider as advised.  Contact one of the following for more information:  · American Association of Kidney Patients www.aakp.org  · National Kidney Foundation www.kidney.org  · American Kidney Fund www.kidneyfund.org  · National Kidney Disease Education Program www.nkdep.nih.gov  When to seek medical advice  Call your health care provider right away if any of these occur:  · Nausea or vomiting  · Fever over 100.4°F (38.0°C)  · Severe weakness, dizziness, fainting, drowsiness, or confusion  · Chest pain or shortness of breath  · Unexpected weight gain or swelling in the legs, ankles, or around your eyes  · Heart beating fast, slowly, or irregularly  · You dont urinate as much as normal, or you arent able to urinate  © 4363-2112 [employer]. [address] [place] [address], [place] [address]. All rights reserved. This information is not intended as a  substitute for professional medical care. Always follow your healthcare professional's instructions.          [facility] Sign-Up     Activating your [facility] account is as easy as 1-2-3!     1) Visit [email or web address], select Sign Up Now, enter this activation code and your date of birth, then select Next.  [email or web address]  Expires: 2/19/2017  3:10 PM      2) Create a username and password to use when you visit [facility] in the future and select a security question in case you lose your password and select Next.    3) Enter your e-mail address and click Sign Up!    Additional Information  If you have questions, please e-mail [email or web address] or call [phone] to talk to our [facility] staff. Remember, [facility] is NOT to be used for urgent needs. For medical emergencies, dial 911.          [facility] complies with applicable Federal civil rights laws and does not discriminate on the basis of race, color, national origin, age, disability, or sex.        Language Assistance Services     ATTENTION: Language assistance services are available, free of charge. Please call [phone].      ATENCIÓN: Si habla español, tiene a morris disposición servicios gratuitos de asistencia lingüística. Llame al [phone].     CHÚ Ý: N?u b?n nói Ti?ng Vi?t, có các d?ch v? h? tr? ngôn ng? mi?n phí dành cho b?n. G?i s? [phone].